# Patient Record
Sex: MALE | Race: BLACK OR AFRICAN AMERICAN | Employment: UNEMPLOYED | URBAN - METROPOLITAN AREA
[De-identification: names, ages, dates, MRNs, and addresses within clinical notes are randomized per-mention and may not be internally consistent; named-entity substitution may affect disease eponyms.]

---

## 2023-11-02 ENCOUNTER — APPOINTMENT (OUTPATIENT)
Dept: GENERAL RADIOLOGY | Age: 28
End: 2023-11-02

## 2023-11-02 ENCOUNTER — HOSPITAL ENCOUNTER (EMERGENCY)
Age: 28
Discharge: HOME | End: 2023-11-04
Attending: EMERGENCY MEDICINE

## 2023-11-02 DIAGNOSIS — F15.10 METHAMPHETAMINE USE (HCC): ICD-10-CM

## 2023-11-02 DIAGNOSIS — R45.1 AGITATION: Primary | ICD-10-CM

## 2023-11-02 DIAGNOSIS — F15.10 STIMULANT ABUSE (HCC): ICD-10-CM

## 2023-11-02 LAB
ALBUMIN SERPL-MCNC: 3.4 G/DL (ref 3.5–5)
ALBUMIN/GLOB SERPL: 1 (ref 0.4–1.6)
ALP SERPL-CCNC: 70 U/L (ref 50–136)
ALT SERPL-CCNC: 30 U/L (ref 12–65)
AMPHET UR QL SCN: POSITIVE
ANION GAP SERPL CALC-SCNC: 6 MMOL/L (ref 2–11)
AST SERPL-CCNC: 46 U/L (ref 15–37)
BARBITURATES UR QL SCN: NEGATIVE
BASOPHILS # BLD: 0.1 K/UL (ref 0–0.2)
BASOPHILS NFR BLD: 1 % (ref 0–2)
BENZODIAZ UR QL: NEGATIVE
BILIRUB SERPL-MCNC: 0.2 MG/DL (ref 0.2–1.1)
BUN SERPL-MCNC: 12 MG/DL (ref 6–23)
CALCIUM SERPL-MCNC: 8.6 MG/DL (ref 8.3–10.4)
CANNABINOIDS UR QL SCN: NEGATIVE
CHLORIDE SERPL-SCNC: 112 MMOL/L (ref 101–110)
CO2 SERPL-SCNC: 24 MMOL/L (ref 21–32)
COCAINE UR QL SCN: NEGATIVE
CREAT SERPL-MCNC: 1.2 MG/DL (ref 0.8–1.5)
DIFFERENTIAL METHOD BLD: ABNORMAL
EOSINOPHIL # BLD: 0.2 K/UL (ref 0–0.8)
EOSINOPHIL NFR BLD: 3 % (ref 0.5–7.8)
ERYTHROCYTE [DISTWIDTH] IN BLOOD BY AUTOMATED COUNT: 16.2 % (ref 11.9–14.6)
GLOBULIN SER CALC-MCNC: 3.5 G/DL (ref 2.8–4.5)
GLUCOSE SERPL-MCNC: 87 MG/DL (ref 65–100)
HCT VFR BLD AUTO: 46.9 % (ref 41.1–50.3)
HGB BLD-MCNC: 14.8 G/DL (ref 13.6–17.2)
IMM GRANULOCYTES # BLD AUTO: 0 K/UL (ref 0–0.5)
IMM GRANULOCYTES NFR BLD AUTO: 0 % (ref 0–5)
LYMPHOCYTES # BLD: 1.9 K/UL (ref 0.5–4.6)
LYMPHOCYTES NFR BLD: 29 % (ref 13–44)
MAGNESIUM SERPL-MCNC: 2.1 MG/DL (ref 1.8–2.4)
MCH RBC QN AUTO: 26.2 PG (ref 26.1–32.9)
MCHC RBC AUTO-ENTMCNC: 31.6 G/DL (ref 31.4–35)
MCV RBC AUTO: 83 FL (ref 82–102)
METHADONE UR QL: NEGATIVE
MONOCYTES # BLD: 0.3 K/UL (ref 0.1–1.3)
MONOCYTES NFR BLD: 5 % (ref 4–12)
NEUTS SEG # BLD: 4.1 K/UL (ref 1.7–8.2)
NEUTS SEG NFR BLD: 62 % (ref 43–78)
NRBC # BLD: 0 K/UL (ref 0–0.2)
OPIATES UR QL: NEGATIVE
PCP UR QL: NEGATIVE
PLATELET # BLD AUTO: 307 K/UL (ref 150–450)
PMV BLD AUTO: 9.3 FL (ref 9.4–12.3)
POTASSIUM SERPL-SCNC: 5.8 MMOL/L (ref 3.5–5.1)
PROT SERPL-MCNC: 6.9 G/DL (ref 6.3–8.2)
RBC # BLD AUTO: 5.65 M/UL (ref 4.23–5.6)
SODIUM SERPL-SCNC: 142 MMOL/L (ref 133–143)
WBC # BLD AUTO: 6.6 K/UL (ref 4.3–11.1)

## 2023-11-02 PROCEDURE — 83735 ASSAY OF MAGNESIUM: CPT

## 2023-11-02 PROCEDURE — 93005 ELECTROCARDIOGRAM TRACING: CPT | Performed by: EMERGENCY MEDICINE

## 2023-11-02 PROCEDURE — 73130 X-RAY EXAM OF HAND: CPT

## 2023-11-02 PROCEDURE — 80053 COMPREHEN METABOLIC PANEL: CPT

## 2023-11-02 PROCEDURE — 80307 DRUG TEST PRSMV CHEM ANLYZR: CPT

## 2023-11-02 PROCEDURE — 85025 COMPLETE CBC W/AUTO DIFF WBC: CPT

## 2023-11-02 PROCEDURE — 99285 EMERGENCY DEPT VISIT HI MDM: CPT

## 2023-11-02 ASSESSMENT — ENCOUNTER SYMPTOMS
VOMITING: 0
SHORTNESS OF BREATH: 0
ABDOMINAL PAIN: 0
COUGH: 0
NAUSEA: 0
COLOR CHANGE: 0

## 2023-11-02 ASSESSMENT — LIFESTYLE VARIABLES
HOW MANY STANDARD DRINKS CONTAINING ALCOHOL DO YOU HAVE ON A TYPICAL DAY: 5 OR 6
HOW OFTEN DO YOU HAVE A DRINK CONTAINING ALCOHOL: 4 OR MORE TIMES A WEEK

## 2023-11-02 NOTE — ED TRIAGE NOTES
Pt arrives via EMS from home. Pts friend called for violent behavior. Pt stated on scene that he wanted to be evaluated for being off meds and alcohol abuse problem.

## 2023-11-03 LAB
EKG ATRIAL RATE: 79 BPM
EKG DIAGNOSIS: NORMAL
EKG P AXIS: 66 DEGREES
EKG P-R INTERVAL: 134 MS
EKG Q-T INTERVAL: 346 MS
EKG QRS DURATION: 88 MS
EKG QTC CALCULATION (BAZETT): 400 MS
EKG R AXIS: 80 DEGREES
EKG T AXIS: 54 DEGREES
EKG VENTRICULAR RATE: 80 BPM
ETHANOL SERPL-MCNC: <3 MG/DL (ref 0–0.08)
POTASSIUM SERPL-SCNC: 3.9 MMOL/L (ref 3.5–5.1)

## 2023-11-03 PROCEDURE — 6370000000 HC RX 637 (ALT 250 FOR IP): Performed by: EMERGENCY MEDICINE

## 2023-11-03 PROCEDURE — 82077 ASSAY SPEC XCP UR&BREATH IA: CPT

## 2023-11-03 PROCEDURE — 84132 ASSAY OF SERUM POTASSIUM: CPT

## 2023-11-03 PROCEDURE — 93010 ELECTROCARDIOGRAM REPORT: CPT | Performed by: INTERNAL MEDICINE

## 2023-11-03 RX ORDER — OLANZAPINE 5 MG/1
10 TABLET ORAL
Status: COMPLETED | OUTPATIENT
Start: 2023-11-03 | End: 2023-11-03

## 2023-11-03 RX ADMIN — SERTRALINE 50 MG: 50 TABLET, FILM COATED ORAL at 13:50

## 2023-11-03 RX ADMIN — OLANZAPINE 10 MG: 5 TABLET, FILM COATED ORAL at 13:49

## 2023-11-03 ASSESSMENT — PAIN - FUNCTIONAL ASSESSMENT: PAIN_FUNCTIONAL_ASSESSMENT: NONE - DENIES PAIN

## 2023-11-03 NOTE — ED NOTES
Patient is resting in bed comfortably with eyes closed. No visual signs of apparent distress. Chest expansion is equal, unlabored, and symmetrical. Safety precautions in place.        Juan Bedoya RN  11/03/23 6616

## 2023-11-03 NOTE — ED NOTES
Patient is resting in bed comfortably with eyes closed. No visual signs of apparent distress. Chest expansion is equal, unlabored, and symmetrical. Safety precautions in place.        Brittaney Scott RN  11/03/23 5788

## 2023-11-03 NOTE — ED NOTES
Patient resting at this time. No signs or symptoms of distress. Respirations even and unlabored. Sitter at bedside. Safety precautions in place.       Lisette Washington RN  11/03/23 4853

## 2023-11-03 NOTE — ED NOTES
Patient is resting in bed comfortably with eyes closed. No visual signs of apparent distress. Chest expansion is equal, unlabored, and symmetrical. Safety precautions in place.        Brittaney Scott RN  11/03/23 0003

## 2023-11-03 NOTE — ED NOTES
Patient resting at this time. No signs or symptoms of distress. Respirations even and unlabored. Sitter at bedside. Safety precautions in place.       Tahira Coleman RN  11/03/23 3569

## 2023-11-03 NOTE — ED NOTES
Patient is resting in bed comfortably with eyes closed. No visual signs of apparent distress. Chest expansion is equal, unlabored, and symmetrical. Safety precautions in place.        Dorinda Campos RN  11/03/23 1171

## 2023-11-03 NOTE — ED NOTES
Patient is resting in bed comfortably with eyes closed. No visual signs of apparent distress. Chest expansion is equal, unlabored, and symmetrical. Safety precautions in place.        Laurence English RN  11/02/23 1349

## 2023-11-03 NOTE — ED NOTES
Patient resting at this time. No signs or symptoms of distress. Respirations even and unlabored. Sitter at bedside. Safety precautions in place.       Larissa Yun RN  11/03/23 3879

## 2023-11-03 NOTE — ED NOTES
Patient is resting in bed comfortably with eyes closed. No visual signs of apparent distress. Chest expansion is equal, unlabored, and symmetrical. Safety precautions in place.        Brittaney Scott RN  11/03/23 4903

## 2023-11-03 NOTE — ED NOTES
Patient is resting in bed comfortably with eyes closed. No visual signs of apparent distress. Chest expansion is equal, unlabored, and symmetrical. Safety precautions in place.        Yamile Macias RN  11/02/23 5562

## 2023-11-03 NOTE — ED NOTES
Patient is resting in bed comfortably with eyes closed. No visual signs of apparent distress. Chest expansion is equal, unlabored, and symmetrical. Safety precautions in place.        Ansley Lopez RN  11/02/23 2019

## 2023-11-03 NOTE — ED NOTES
Patient is resting in bed comfortably with eyes closed. No visual signs of apparent distress. Chest expansion is equal, unlabored, and symmetrical. Safety precautions in place.        Chester Rosas RN  11/03/23 5384

## 2023-11-03 NOTE — ED NOTES
Patient is resting in bed comfortably with eyes closed. No visual signs of apparent distress. Chest expansion is equal, unlabored, and symmetrical. Safety precautions in place.        Argenis Lopez RN  11/03/23 5382

## 2023-11-03 NOTE — CARE COORDINATION
Patient shares little information about the reason for his visit. When confronted about destruction of property yesterday he will answer Yes that he did do that. Does not offer further explanation or details. Acknowledges previous in-patient stay in [address] [place] [address]. States he used to be on medication - none currently. Patient give permission to speak with mom Renea Heads [phone].

## 2023-11-03 NOTE — ED NOTES
Patient resting at this time. No signs or symptoms of distress. Respirations even and unlabored. Sitter at bedside. Safety precautions in place.       Master Hyman RN  11/03/23 8706

## 2023-11-03 NOTE — ED NOTES
Patient is resting in bed comfortably with eyes closed. No visual signs of apparent distress. Chest expansion is equal, unlabored, and symmetrical. Safety precautions in place.        Kelly Morrison RN  11/03/23 6925

## 2023-11-03 NOTE — CARE COORDINATION
Mom calls and states patient was previously in [facility] in [address] [place] [address] 1 time. He has dx of schizophrenia, bipolar, and depression. She states it has been years since he has been on medication. Mom is [facility] [phone]. She lives in [address] [place] [address] and in [address] to see patient. At discharge she is agreeable to take patient back home with her.

## 2023-11-03 NOTE — ED NOTES
Patient is resting in bed comfortably with eyes closed. No visual signs of apparent distress. Chest expansion is equal, unlabored, and symmetrical. Safety precautions in place.        Kenya Del Real RN  11/03/23 7358

## 2023-11-03 NOTE — ED NOTES
Patient is resting in bed comfortably with eyes closed. No visual signs of apparent distress. Chest expansion is equal, unlabored, and symmetrical. Safety precautions in place.        Laurence English RN  11/03/23 1186

## 2023-11-03 NOTE — ED NOTES
Patient resting at this time. No signs or symptoms of distress. Respirations even and unlabored. Sitter at bedside. Safety precautions in place.       Glenny Salinas RN  11/03/23 9688

## 2023-11-03 NOTE — ED NOTES
Patient resting at this time. No signs or symptoms of distress. Respirations even and unlabored. Sitter at bedside. Safety precautions in place.       Fco Palencia RN  11/03/23 0925

## 2023-11-03 NOTE — ED NOTES
Patient is resting in bed comfortably with eyes closed. No visual signs of apparent distress. Chest expansion is equal, unlabored, and symmetrical. Safety precautions in place.        Kelly Morrison RN  11/02/23 7984

## 2023-11-03 NOTE — ED NOTES
Patient lying in bed watching TV. No distress noted or needs voiced.       Dorinda Campos RN  11/02/23 9846

## 2023-11-04 VITALS
SYSTOLIC BLOOD PRESSURE: 120 MMHG | OXYGEN SATURATION: 98 % | DIASTOLIC BLOOD PRESSURE: 81 MMHG | HEART RATE: 90 BPM | HEIGHT: 68 IN | BODY MASS INDEX: 19.4 KG/M2 | WEIGHT: 128 LBS | RESPIRATION RATE: 15 BRPM | TEMPERATURE: 97.9 F

## 2023-11-04 PROCEDURE — 6370000000 HC RX 637 (ALT 250 FOR IP): Performed by: EMERGENCY MEDICINE

## 2023-11-04 RX ADMIN — SERTRALINE 50 MG: 50 TABLET, FILM COATED ORAL at 09:18

## 2023-11-04 NOTE — ED NOTES
Pt asleep resting comfortably breathing unlabored     [facility], [address]  11/04/23 0141

## 2023-11-04 NOTE — ED NOTES
Pt asleep resting comfortably breathing unlabored     [facility], [address]  11/04/23 2045

## 2023-11-04 NOTE — ED NOTES
Pt asleep resting comfortably breathing unlabored     [facility], [address]  11/04/23 2433

## 2023-11-04 NOTE — ED NOTES
Report given to [facility], RN. Transferring patient care at this time.       Leela Flynn RN  11/04/23 4550

## 2023-11-04 NOTE — ED NOTES
Patient's mother at bedside at this time. Patient's mother states she would like to come back tomorrow to take the patient home. Patient's mother states she will be back before 1200 tomorrow.       Bhupendra Bansal RN  11/03/23 8431

## 2023-11-04 NOTE — DISCHARGE INSTRUCTIONS
Follow-up with outpatient mental health. Take your medication daily. Return to the ER for worsening or worrisome symptoms.